# Patient Record
Sex: FEMALE | Race: WHITE | URBAN - METROPOLITAN AREA
[De-identification: names, ages, dates, MRNs, and addresses within clinical notes are randomized per-mention and may not be internally consistent; named-entity substitution may affect disease eponyms.]

---

## 2023-09-13 ENCOUNTER — OFFICE VISIT (OUTPATIENT)
Dept: FAMILY MEDICINE CLINIC | Facility: CLINIC | Age: 23
End: 2023-09-13
Payer: COMMERCIAL

## 2023-09-13 VITALS
OXYGEN SATURATION: 99 % | TEMPERATURE: 98 F | DIASTOLIC BLOOD PRESSURE: 68 MMHG | SYSTOLIC BLOOD PRESSURE: 118 MMHG | RESPIRATION RATE: 18 BRPM | HEART RATE: 97 BPM | WEIGHT: 242.19 LBS

## 2023-09-13 DIAGNOSIS — G43.909 MIGRAINE WITHOUT STATUS MIGRAINOSUS, NOT INTRACTABLE, UNSPECIFIED MIGRAINE TYPE: ICD-10-CM

## 2023-09-13 DIAGNOSIS — R53.83 OTHER FATIGUE: Primary | ICD-10-CM

## 2023-09-13 DIAGNOSIS — Z30.46 NEXPLANON REMOVAL: ICD-10-CM

## 2023-09-13 DIAGNOSIS — Z13.6 SCREENING FOR CARDIOVASCULAR CONDITION: ICD-10-CM

## 2023-09-13 DIAGNOSIS — Z87.820 HISTORY OF CONCUSSION: ICD-10-CM

## 2023-09-13 DIAGNOSIS — M54.12 CERVICAL RADICULOPATHY: ICD-10-CM

## 2023-09-13 DIAGNOSIS — Z23 NEED FOR VACCINATION: ICD-10-CM

## 2023-09-13 PROCEDURE — 90686 IIV4 VACC NO PRSV 0.5 ML IM: CPT | Performed by: FAMILY MEDICINE

## 2023-09-13 PROCEDURE — 90471 IMMUNIZATION ADMIN: CPT | Performed by: FAMILY MEDICINE

## 2023-09-13 PROCEDURE — 99203 OFFICE O/P NEW LOW 30 MIN: CPT | Performed by: FAMILY MEDICINE

## 2023-09-13 PROCEDURE — 3078F DIAST BP <80 MM HG: CPT | Performed by: FAMILY MEDICINE

## 2023-09-13 PROCEDURE — 3074F SYST BP LT 130 MM HG: CPT | Performed by: FAMILY MEDICINE

## 2023-09-13 RX ORDER — METHYLPREDNISOLONE 4 MG/1
TABLET ORAL
Qty: 1 EACH | Refills: 0 | Status: SHIPPED | OUTPATIENT
Start: 2023-09-13

## 2023-09-13 RX ORDER — DOXYCYCLINE HYCLATE 100 MG/1
100 CAPSULE ORAL 2 TIMES DAILY
COMMUNITY
Start: 2023-08-20 | End: 2023-09-13 | Stop reason: ALTCHOICE

## 2023-09-13 RX ORDER — CYCLOBENZAPRINE HCL 10 MG
10 TABLET ORAL NIGHTLY PRN
Qty: 30 TABLET | Refills: 0 | Status: SHIPPED | OUTPATIENT
Start: 2023-09-13

## 2023-09-13 RX ORDER — BENZONATATE 100 MG/1
100 CAPSULE ORAL 3 TIMES DAILY PRN
COMMUNITY
Start: 2023-08-20 | End: 2023-09-13 | Stop reason: ALTCHOICE

## 2023-09-13 RX ORDER — OMEPRAZOLE 40 MG/1
40 CAPSULE, DELAYED RELEASE ORAL DAILY
COMMUNITY
Start: 2023-07-13 | End: 2023-09-13 | Stop reason: ALTCHOICE

## 2023-09-13 RX ORDER — FLUTICASONE PROPIONATE 50 MCG
1 SPRAY, SUSPENSION (ML) NASAL 2 TIMES DAILY
COMMUNITY
Start: 2023-08-20

## 2023-11-06 NOTE — PROGRESS NOTES
Virtual/Telephone Check-In    Lucian Cespedes verbally consents to a Virtual/Telephone Check-In service on 11/06/23. Patient has been referred to the [facility] website at [email or web address] to review the yearly Consent to Treat document. Patient understands and accepts financial responsibility for any deductible, co-insurance and/or co-pays associated with this service. Telehealth Verbal Consent   I conducted a telehealth visit with Lucian Cespedes today, 11/06/23, which was completed using two-way, real-time interactive audio and video communication. This has been done in good navin to provide continuity of care in the best interest of the provider-patient relationship, due to the COVID -19 public health crisis/national emergency where restrictions of face-to-face office visits are ongoing. Every conscious effort was taken to allow for sufficient and adequate time to complete the visit. The patient was made aware of the limitations of the telehealth visit, including treatment limitations as no physical exam could be performed. The patient was advised to call 911 or to go to the ER in case there was an emergency. The patient was also advised of the potential privacy & security concerns related to the telehealth platform. The patient was made aware of where to find [facility] notice of privacy practices, telehealth consent form and other related consent forms and documents. which are located on the [facility] website. The patient verbally agreed to telehealth consent form, related consents and the risks discussed. Lastly, the patient confirmed that they were in [place]. Included in this visit, time may have been spent reviewing labs, medications, radiology tests and decision making. Appropriate medical decision-making and tests are ordered as detailed in the plan of care above.   Coding/billing information is submitted for this visit based on complexity of care and/or time spent for the visit. CHIEF COMPLAINT:  Patient presents with:  Sinus Problem      HPI:  Naveed Gallegos is a 21year old female who presents for a video visit. Pt presents for sinus congestion for 2 weeks. Symptoms have been worsening since onset. Sinus congestion/pain is described as a pressure and is located mainly at cheeks. Patient reports thick purulent nasal discharge, +sinus headache, +intermittent productive cough. Denies fever, sore throat, chills, dental pain, tinnitus. Has treated symptoms with Sudafed and Tylenol. Current Outpatient Medications   Medication Sig Dispense Refill    amoxicillin clavulanate 875-125 MG Oral Tab Take 1 tablet by mouth 2 (two) times daily for 7 days. 14 tablet 0    Acetaminophen 500 MG Oral Cap Take 2 capsules (1,000 mg total) by mouth. ibuprofen 600 MG Oral Tab Take 1 tablet (600 mg total) by mouth. fluticasone propionate 50 MCG/ACT Nasal Suspension 1 spray by Nasal route 2 (two) times daily. (Patient not taking: Reported on 10/18/2023)      cyclobenzaprine 10 MG Oral Tab Take 1 tablet (10 mg total) by mouth nightly as needed for Muscle spasms.  30 tablet 0     Past Medical History:   Diagnosis Date    Allergic rhinitis     Anxiety     Depression     Esophageal reflux     Obesity      Past Surgical History:   Procedure Laterality Date    CHOLECYSTECTOMY      FOREARM/WRIST SURGERY UNLISTED Right     ulnar nerve, 2019             Social History     Socioeconomic History    Marital status:    Tobacco Use    Smoking status: Never    Smokeless tobacco: Never   Vaping Use    Vaping Use: Some days    Substances: Nicotine   Substance and Sexual Activity    Alcohol use: Yes     Comment: socially    Drug use: Never    Sexual activity: Yes     Partners: Male     Birth control/protection: Implant   Other Topics Concern    Caffeine Concern No    Exercise No    Seat Belt No    Stress Concern Yes    Weight Concern Yes        REVIEW OF SYSTEMS:  GENERAL: ok appetite  SKIN: no rashes or abnormal skin lesions  HEENT: See HPI  LUNGS: denies shortness of breath or wheezing, See HPI  CARDIOVASCULAR: denies chest pain or palpitations   GI: denies N/V/C or abdominal pain  NEURO: + headaches    EXAM:  General: Alert, Well-appearing, and In no acute distress  Respiratory:   Speaking in full sentences comfortably  Normal work of breathing  No cough during visit  Head: Normocephalic, pressure and discomfort to cheeks per pt  Nose: No obvious nasal discharge. Skin: No obvious rashes or lesions from what observed. No results found for this or any previous visit (from the past 24 hour(s)). ASSESSMENT AND PLAN:  Mariela Mckinney is a 21year old female who presents with symptoms that are consistent with    ASSESSMENT:   Acute non-recurrent maxillary sinusitis  (primary encounter diagnosis)    PLAN: Meds as below. See patient Instructions    Meds & Refills for this Visit:  Requested Prescriptions     Signed Prescriptions Disp Refills    amoxicillin clavulanate 875-125 MG Oral Tab 14 tablet 0     Sig: Take 1 tablet by mouth 2 (two) times daily for 7 days. Risks, benefits, and side effects of medication explained and discussed. There are no Patient Instructions on file for this visit. The patient indicates understanding of these issues and agrees to the plan. The patient is asked to f/u with PCP if sx's persist or worsen. Mariela Mckinney understands video visit evaluation is not a substitute for face-to-face examination or emergency care. Patient advised to go to ER or call 911 for worsening symptoms or acute distress.

## 2023-11-08 NOTE — PROGRESS NOTES
Subjective:  21year old    Chief Complaint   Patient presents with    Procedure     Nexp. Removal.     Pt requesting removal of nexplanon 2/2 irregular menstrual bleeding  Pt desires other contraceptive option    Denies personal/family history of bleeding/blood clotting disorder  Denies migraine with aura  Denies smoking  Review of Systems:  Pertinent items are noted in the HPI. Objective:  /60   Ht 70\"   Wt 244 lb (110.7 kg)     Physical Examination:  General appearance: Well dressed, well nourished in no apparent distress  Neurologic/Psychiatric: Alert and oriented to person, place and time, mood normal, affect appropriate  Extremities: + Implant palpated LUE    Assessment/Plan:      Diagnoses and all orders for this visit:    Encounter for initial prescription of vaginal ring hormonal contraceptive  -     Etonogestrel-Ethinyl Estradiol (NUVARING) 0.12-0.015 MG/24HR Vaginal Ring; Place 1 Ring vaginally every 30 (thirty) days. 3 weeks in, one week out    Encounter for removal of subdermal contraceptive implant  -     Nexplanon Removal Only [68580]        Return for annual well woman exam or sooner if needed.

## 2023-11-08 NOTE — PROCEDURES
Nexplanon Removal Procedure Note    Post-Operative Diagnosis and Indication: Desires removal    Procedure Details: The risks including infection, scarring, bleeding and difficulty with removal were explained to the patient and verbal informed consent obtained. Procedure performed under sterile conditions with betadine prep. Incision site marked at distal tip of implant on left arm and area infiltrated with 1% lidocaine solution. Small stab incision made and nexplanon device removed intact without difficulty using ekaterina clamp. Steri strips, adhesive bandage and wrap bandage applied. Condition:  Stable    Complications:  None    Plan:  The patient was advised to call for any fever, redness, bruising, discharge or worsening pain. Follow up as needed or for routine gynecologic examination.

## 2024-02-15 NOTE — ED PROVIDER NOTES
Patient Seen in: [facility]      History     Chief Complaint   Patient presents with    Cough/URI     Stated Complaint: sore throat    Subjective:   HPI    23-year-old female here with complaint of a wheezy dry cough for the past couple days with sore throat and bodyaches.  Patient denies chest pain, shortness of breath, abdominal pain, nausea, vomiting or diarrhea.  Patient is tolerating p.o. speaking full sentences.  Afebrile.    Objective:   Past Medical History:   Diagnosis Date    Allergic rhinitis     Anxiety     Depression     Esophageal reflux     Obesity               Past Surgical History:   Procedure Laterality Date    CHOLECYSTECTOMY      FOREARM/WRIST SURGERY UNLISTED Right     ulnar nerve, 2019                      The patient's medication list, past medical history and social history elements  as listed in today's nurse's notes are reviewed and agree.   The patient's family history is reviewed and is noncontributory to the presenting problem, except as indicated as above.   Social History     Socioeconomic History    Marital status:    Tobacco Use    Smoking status: Never    Smokeless tobacco: Never   Vaping Use    Vaping Use: Former    Substances: Nicotine   Substance and Sexual Activity    Alcohol use: Yes     Comment: socially    Drug use: Never    Sexual activity: Yes     Partners: Male     Birth control/protection: Implant   Other Topics Concern    Caffeine Concern No    Exercise No    Seat Belt No    Stress Concern Yes    Weight Concern Yes              Review of Systems    Positive for stated complaint: sore throat  Other systems are as noted in HPI.  Constitutional and vital signs reviewed.      All other systems reviewed and negative except as noted above.    Physical Exam     ED Triage Vitals [02/15/24 1008]   /78   Pulse 108   Resp 20   Temp 97.7 °F (36.5 °C)   Temp src Temporal   SpO2 98 %   O2 Device None (Room air)       Current:/78   Pulse 108    Temp 97.7 °F (36.5 °C) (Temporal)   Resp 20   Wt 99.8 kg   LMP 02/01/2024   SpO2 98%   BMI 31.57 kg/m²         Physical Exam  Vitals and nursing note reviewed.   Constitutional:       Appearance: Normal appearance. She is well-developed.   HENT:      Head: Normocephalic.      Jaw: There is normal jaw occlusion.      Right Ear: Tympanic membrane and external ear normal.      Left Ear: Tympanic membrane and external ear normal.      Nose: Rhinorrhea present. Rhinorrhea is clear.      Mouth/Throat:      Lips: Pink.      Mouth: Mucous membranes are moist.      Pharynx: Oropharynx is clear. Posterior oropharyngeal erythema and uvula swelling present.      Comments: Uvula midline but enlarged: No trismus or drooling, no peritonsillar abscess noted moderate cobblestoning the posterior pharynx.  Eyes:      Conjunctiva/sclera: Conjunctivae normal.      Pupils: Pupils are equal, round, and reactive to light.   Cardiovascular:      Rate and Rhythm: Normal rate and regular rhythm.      Heart sounds: Normal heart sounds.   Pulmonary:      Effort: Pulmonary effort is normal.      Breath sounds: Wheezing present.   Musculoskeletal:      Cervical back: Normal range of motion and neck supple.   Skin:     General: Skin is warm.      Capillary Refill: Capillary refill takes less than 2 seconds.   Neurological:      General: No focal deficit present.      Mental Status: She is alert and oriented to person, place, and time.   Psychiatric:         Mood and Affect: Mood normal.         Behavior: Behavior normal.         Thought Content: Thought content normal.         Judgment: Judgment normal.             ED Course     Labs Reviewed   RAPID STREP A - Abnormal; Notable for the following components:       Result Value    Strep A by PCR Positive (*)     All other components within normal limits   POCT FLU TEST - Normal    Narrative:     This assay is a rapid molecular in vitro test utilizing nucleic acid amplification of influenza A  and B viral RNA.   RAPID SARS-COV-2 BY PCR - Normal                      MDM       Clinical Impression: strep pharyngitis/PND/wheezing  Course of Treatment:   Take the full course of antibiotics as prescribed.  Recommend a probiotic daily.  While the antibiotics kill the bad bacteria they also kill the good gut chemo.  GERD toothbrush.  Some good over-the-counter brands are Culturelle, Florastor and Align.  Recommend taking an over the counter antihistamine daily: IE zyrtec/claritin.  Sleep more upright. Use chloraseptic spray to help stop the cough trigger reflex.  Push fluids and gargle with warm saline rinses.   Use your inhaler every 4-6 hours as needed.  If symptoms persist or worsen i.e. increasing fevers or shortness of breath go directly to the emergency room.  Otherwise follow-up with your primary care physician for further evaluation and treatment.        The patient is encouraged to return if any concerning symptoms arise. Additional verbal discharge instructions are given and discussed. Discharge medications are discussed. The patient is in good condition throughout the visit today and remains so upon discharge. I discuss the plan of care with the patient, who expresses understanding. All questions and concerns are addressed to the patient's satisfaction prior to discharge today.  Previous conversations with PCP and charts were reviewed.                                       Disposition and Plan     Clinical Impression:  1. Strep pharyngitis    2. PND (post-nasal drip)    3. Wheezing         Disposition:  Discharge  2/15/2024 11:06 am    Follow-up:  Dorothy Lovell MD  [address]  [facility] [address]  [phone]          Dorothy Lovell MD  [address]  [facility] [address]  [phone]                Medications Prescribed:  Current Discharge Medication List        START taking these medications    Details   amoxicillin 500 MG Oral Tab Take 1 tablet (500 mg total) by  mouth 2 (two) times daily for 10 days.  Qty: 20 tablet, Refills: 0

## 2024-02-15 NOTE — DISCHARGE INSTRUCTIONS
Please return to the ER/clinic if symptoms worsen. Follow-up with your PCP in 24-48 hours as needed.    Take the full course of antibiotics as prescribed.  Recommend a probiotic daily.  While the antibiotics kill the bad bacteria they also kill the good gut chemo.  GERD toothbrush.  Some good over-the-counter brands are Culturelle, Florastor and Align.  Recommend taking an over the counter antihistamine daily: IE zyrtec/claritin.  Sleep more upright. Use chloraseptic spray to help stop the cough trigger reflex.  Push fluids and gargle with warm saline rinses.   Use your inhaler every 4-6 hours as needed.  If symptoms persist or worsen i.e. increasing fevers or shortness of breath go directly to the emergency room.  Otherwise follow-up with your primary care physician for further evaluation and treatment.

## 2024-02-15 NOTE — ED INITIAL ASSESSMENT (HPI)
Recently attended a wedding. Sunday, c/o headache and tiredness. Yesterday c/o body aches, sore throat, head ache, nausea, nasal congestion, cough and stuffy ears. Has pressure in rib cages.

## 2024-04-03 NOTE — TELEPHONE ENCOUNTER
Pt called, thinks she is having an allergic reaction. Reports she has been experiencing burning all over her body that leaves her light headed and feeling sick. States it started around Dec/Higinio and would happen once a month, but recently has been happening once a week.     Pt reports she experiencing the burning this morning, only her hands got bright red, which spread all over her body. She noticed red bumps on her arms, which went away after the burning stopped.     Pt reports she has not noticed a pattern to when it happens. Denies any new lotions, body washes, perfumes, detergent, fabric softeners, pets, medications, or exposure to new chemicals. Pt denies SOB, face/lips/throat swelling, or fever.    Informed pt that next available appt with PCP was on 4/5/2024. Offered appt with Dr. Alvarez today. Pt declined. States she works 8 am to 5 pm Monday through Friday.    Pt asked for appt with PCP. Appt set for 4/5/2024 at 1:20 pm.     Instructed pt to go to ED of she develops swelling of face/lips/throat, SOB, chest pain, or severe headache. Pt v/u.      Reason for Disposition   Mild widespread rash    Answer Assessment - Initial Assessment Questions  1. APPEARANCE of RASH: \"Describe the rash.\" (e.g., spots, blisters, raised areas, skin peeling, scaly)      Bumps  2. SIZE: \"How big are the spots?\" (e.g., tip of pen, eraser, coin; inches, centimeters)      Tip of pen  3. LOCATION: \"Where is the rash located?\"      Arms, maybe everywhere else   4. COLOR: \"What color is the rash?\" (Note: It is difficult to assess rash color in people with darker-colored skin. When this situation occurs, simply ask the caller to describe what they see.)      Red  5. ONSET: \"When did the rash begin?\"      A couple months ago, maybe Dec/Higinio  6. FEVER: \"Do you have a fever?\" If Yes, ask: \"What is your temperature, how was it measured, and when did it start?\"      No  7. ITCHING: \"Does the rash itch?\" If Yes, ask: \"How bad is the itch?\" (Scale  1-10; or mild, moderate, severe)      Yes, 10 out of 10  8. CAUSE: \"What do you think is causing the rash?\"      Possible allergic reaction  9. MEDICINE FACTORS: \"Have you started any new medicines within the last 2 weeks?\" (e.g., antibiotics)       No  10. OTHER SYMPTOMS: \"Do you have any other symptoms?\" (e.g., dizziness, headache, sore throat, joint pain)        Burning all over, arms hurt, light headed  11. PREGNANCY: \"Is there any chance you are pregnant?\" \"When was your last menstrual period?\"        No    Protocols used: Rash or Redness - Widespread-A-[place]

## 2024-04-08 NOTE — PROGRESS NOTES
CHIEF COMPLAINT:   Chief Complaint   Patient presents with    Itching     Patient is C/O a skin irritation. Itching and burning all over. She has had this for a couple of months         HPI:     Darling Metzger is a 23 year old female presents for Derm problem.    Derm problem:  Pt has noticed splotchy spots over her entire body.  Has been ongoing for a few months.  She feels a burning and itchy all over.  She denies any tongue or lip swelling, no difficulty breathing.  She took Benadryl the other day, but it made her so sleepy.  She has no hives. One time it showed up at white bumps , like goosebumps on her skin.  When the rash happens she turns bright red. She denies any new lotions/soaps/perfumes.               HISTORY:  Past Medical History:   Diagnosis Date    Allergic rhinitis     Anxiety     Depression     Esophageal reflux     Obesity       Past Surgical History:   Procedure Laterality Date    CHOLECYSTECTOMY      FOREARM/WRIST SURGERY UNLISTED Right     ulnar nerve, 2019            Family History   Problem Relation Age of Onset    Depression Mother     Obesity Mother     Psychiatric Mother     Depression Father     Psychiatric Father     Anemia Brother     Asthma Maternal Grandmother     Dementia Maternal Grandmother     Diabetes Maternal Grandmother     Heart Disorder Maternal Grandmother     Stroke Maternal Grandmother     Breast Cancer Neg     Colon Cancer Neg       Social History:   Social History     Socioeconomic History    Marital status:    Tobacco Use    Smoking status: Never    Smokeless tobacco: Never   Vaping Use    Vaping Use: Former    Substances: Nicotine   Substance and Sexual Activity    Alcohol use: Yes     Comment: socially    Drug use: Never    Sexual activity: Yes     Partners: Male     Birth control/protection: Implant   Other Topics Concern    Caffeine Concern No    Exercise No    Seat Belt No    Stress Concern Yes    Weight Concern Yes        Medications (Active  prior to today's visit):  Current Outpatient Medications   Medication Sig Dispense Refill    predniSONE 20 MG Oral Tab Take 2 tab (40 MG) PO QD x 5 days 10 tablet 0    Etonogestrel-Ethinyl Estradiol (NUVARING) 0.12-0.015 MG/24HR Vaginal Ring Place 1 Ring vaginally every 30 (thirty) days. 3 weeks in, one week out 3 each 3    Acetaminophen 500 MG Oral Cap Take 2 capsules (1,000 mg total) by mouth.      ibuprofen 600 MG Oral Tab Take 1 tablet (600 mg total) by mouth.      cyclobenzaprine 10 MG Oral Tab Take 1 tablet (10 mg total) by mouth nightly as needed for Muscle spasms. 30 tablet 0    fluticasone propionate 50 MCG/ACT Nasal Suspension 1 spray by Nasal route 2 (two) times daily. (Patient not taking: Reported on 10/18/2023)         Allergies:  No Known Allergies    PSFH elements reviewed from today and agreed except as otherwise stated in HPI.  ROS:     Review of Systems   Constitutional:  Negative for appetite change and fatigue.   Respiratory:  Negative for shortness of breath and wheezing.    Gastrointestinal:  Negative for abdominal pain, constipation, diarrhea, nausea and vomiting.   Skin:  Positive for color change and rash.         Pertinent positives and negatives noted in the the HPI.    PHYSICAL EXAM:   /72 (BP Location: Right arm, Patient Position: Sitting, Cuff Size: adult)   Pulse 105   Temp 100 °F (37.8 °C) (Temporal)   Resp 21   Wt 238 lb 6.4 oz (108.1 kg)   LMP 03/28/2024   SpO2 98%   BMI 34.21 kg/m²   Vital signs reviewed.Appears stated age, well groomed.  Physical Exam     LABS     Admission on 02/15/2024, Discharged on 02/15/2024   Component Date Value    Strep A by PCR 02/15/2024 Positive (A)     POCT INFLUENZA A 02/15/2024 Negative     POCT INFLUENZA B 02/15/2024 Negative     Rapid SARS-CoV-2 by PCR 02/15/2024 Not Detected       REVIEWED THIS VISIT  ASSESSMENT/PLAN:   23 year old female with    1. Pruritus  - unclear etiology  - START Predinsone x 5 days, R/B/SE of new med d/ wpt  -  advised after completion of Prednisone, may take OTC PO antihistamine BID, R/B/SE of new med d/w pt  - referral to Allergist  - predniSONE 20 MG Oral Tab; Take 2 tab (40 MG) PO QD x 5 days  Dispense: 10 tablet; Refill: 0  - Allergy Referral - In Network    2. Urticaria  See above.    - predniSONE 20 MG Oral Tab; Take 2 tab (40 MG) PO QD x 5 days  Dispense: 10 tablet; Refill: 0  - Allergy Referral - In Network      Meds This Visit:  Requested Prescriptions     Signed Prescriptions Disp Refills    predniSONE 20 MG Oral Tab 10 tablet 0     Sig: Take 2 tab (40 MG) PO QD x 5 days       Health Maintenance:  Health Maintenance   Topic Date Due    Chlamydia Screening  Never done    COVID-19 Vaccine (4 - 2023-24 season) 05/05/2024 (Originally 9/1/2023)    Annual Physical  10/18/2024    Pap Smear  11/02/2024    DTaP,Tdap,and Td Vaccines (8 - Td or Tdap) 04/25/2032    Influenza Vaccine  Completed    Annual Depression Screening  Completed    HPV Vaccines  Completed    Pneumococcal Vaccine: Birth to 64yrs  Aged Out         Patient/Caregiver Education: There are no barriers to learning. Medical education done.   Outcome: Patient verbalizes understanding and agrees with plan. Advised to call or RTC if symptoms persist or worsen.    Problem List:     Patient Active Problem List   Diagnosis    Mixed hyperlipidemia    Snoring    Arthritis of left hip    Biliary colic    Bipolar disorder (HCC)    Cholelithiasis without obstruction    Elevated transaminase level    Epigastric pain    Episode of recurrent major depressive disorder (HCC)    Other specified anxiety disorders    Gastritis    Obesity affecting pregnancy, antepartum (HCC)    Ulnar nerve palsy       Imaging & Referrals:  ALLERGY - INTERNAL     4/8/2024  Dorothy Lovell MD      Patient understands plan and follow-up.  No follow-ups on file.

## 2024-04-22 NOTE — TELEPHONE ENCOUNTER
Called pt, asked her the specific reason the allergist wants her to see rheumatology.     Pt reports she tested positive for WILLIAN and allergist suspects an autoimmune disorder.    Pt wants MCM sent to her once referral is signed.    Referral pended for provider review and signature.

## 2024-04-22 NOTE — TELEPHONE ENCOUNTER
Patient saw the allergist and they recommend that she go see a rheumatologist so she needs a referral

## 2024-04-23 NOTE — TELEPHONE ENCOUNTER
Referred to Provider Information:  Provider Address Phone   Kristyn Shell, DO [address] [place], [place] [id number] [phone]     Dorothy Lovell MD      4/22/24 10:03 PM  Note  Referral to Rheum Dr. Simons, signed.     Please inform pt that wait time is commongly 2-3 months wait for appt.  She can take first available with Dr. Simons or any other doctor in the office.     thanks          MCM sent to pt.

## 2024-04-23 NOTE — TELEPHONE ENCOUNTER
From: Darling Metzger  To: Dorothy Lovell  Sent: 4/23/2024 12:15 PM CDT  Subject: Referral     Hey,   I called yesterday asking about a referral to go see a rheumatologist. The allergist recommended it and suggested that I go through my PCP to get it. I was wondering if I need to come in and be seen again or if you could just send it to me. Thank you!     Darling Metzger

## 2024-04-23 NOTE — TELEPHONE ENCOUNTER
Referral to Rheum Dr. Simons, signed.    Please inform pt that wait time is commongly 2-3 months wait for appt.  She can take first available with Dr. Simons or any other doctor in the office.    thanks

## 2024-05-04 NOTE — ED PROVIDER NOTES
Patient Seen in: [facility]      History     Chief Complaint   Patient presents with    Cough/URI    Sore Throat     Stated Complaint: congestion, sore throat, ringing in ears, lethargic, loss of appetite    Subjective:   HPI  23-year-old who works at a  who his parents recently had COVID and strep is going around the  presents with multiple complaints.  Patient is complaining of congestion, ear congestion with ear ringing, fatigue, sore throat, decreased appetite, and a cough.  She denies any fever.  Patient's daughter also recently was sick.    Objective:   Past Medical History:    Allergic rhinitis    Anxiety    Depression    Esophageal reflux    Obesity              Past Surgical History:   Procedure Laterality Date    Cholecystectomy      Forearm/wrist surgery unlisted Right     ulnar nerve, 2019                      Social History     Socioeconomic History    Marital status:    Tobacco Use    Smoking status: Never    Smokeless tobacco: Never   Vaping Use    Vaping status: Some Days    Substances: Nicotine   Substance and Sexual Activity    Alcohol use: Yes     Comment: socially    Drug use: Never    Sexual activity: Yes     Partners: Male     Birth control/protection: Implant   Other Topics Concern    Caffeine Concern No    Exercise No    Seat Belt No    Stress Concern Yes    Weight Concern Yes     Social Determinants of Health     Financial Resource Strain: Medium Risk (2022)    Received from [facility]    Overall Financial Resource Strain (CARDIA)     Difficulty of Paying Living Expenses: Somewhat hard   Food Insecurity: No Food Insecurity (2022)    Received from [facility]    Hunger Vital Sign     Worried About Running Out of Food in the Last Year: Never true     Ran Out of Food in the Last Year: Never true   Transportation Needs: No Transportation Needs (2022)    Received from [facility]    PRAPARE -  Transportation     Lack of Transportation (Medical): No     Lack of Transportation (Non-Medical): No   Physical Activity: Unknown (2/8/2022)    Received from [facility]    Exercise Vital Sign     Days of Exercise per Week: 2 days     Minutes of Exercise per Session: Patient declined   Stress: Stress Concern Present (2/8/2022)    Received from [facility]    [employer] [place] [employer] - Occupational Stress Questionnaire     Feeling of Stress : Rather much   Social Connections: Unknown (2/8/2022)    Received from [facility]    Social Connection and Isolation Panel [NHANES]     Frequency of Communication with Friends and Family: Three times a week     Frequency of Social Gatherings with Friends and Family: Three times a week     Attends Episcopal Services: Never     Active Member of Clubs or Organizations: No     Attends Club or Organization Meetings: Never     Marital Status: Patient declined   Housing Stability: High Risk (2/8/2022)    Received from [facility]    Housing Stability Vital Sign     Unable to Pay for Housing in the Last Year: Yes     Number of Places Lived in the Last Year: 2     Unstable Housing in the Last Year: No              Review of Systems   All other systems reviewed and are negative.      Positive for stated complaint: congestion, sore throat, ringing in ears, lethargic, loss of appetite  Other systems are as noted in HPI.  Constitutional and vital signs reviewed.      All other systems reviewed and negative except as noted above.    Physical Exam     ED Triage Vitals [05/04/24 1308]   /74   Pulse 89   Resp 20   Temp 98.7 °F (37.1 °C)   Temp src Oral   SpO2 99 %   O2 Device None (Room air)       Current:/74   Pulse 89   Temp 98.7 °F (37.1 °C) (Oral)   Resp 20   Ht 177.8 cm (5' 10\")   Wt 104.3 kg   LMP 04/25/2024   SpO2 99%   BMI 33.00 kg/m²         Physical Exam  Vitals and nursing note reviewed.    Constitutional:       General: She is not in acute distress.     Appearance: She is well-developed. She is not ill-appearing or toxic-appearing.   HENT:      Right Ear: Tympanic membrane, ear canal and external ear normal.      Left Ear: Tympanic membrane, ear canal and external ear normal.      Nose: Congestion present. No rhinorrhea.      Mouth/Throat:      Pharynx: No oropharyngeal exudate or posterior oropharyngeal erythema.   Cardiovascular:      Rate and Rhythm: Normal rate and regular rhythm.      Heart sounds: Normal heart sounds.   Pulmonary:      Effort: Pulmonary effort is normal. No respiratory distress.      Breath sounds: Normal breath sounds. No wheezing.   Skin:     General: Skin is warm and dry.   Neurological:      Mental Status: She is alert and oriented to person, place, and time.               ED Course     Labs Reviewed   RAPID STREP A - Abnormal; Notable for the following components:       Result Value    Strep A by PCR Positive (*)     All other components within normal limits   POCT FLU TEST - Normal    Narrative:     This assay is a rapid molecular in vitro test utilizing nucleic acid amplification of influenza A and B viral RNA.   RAPID SARS-COV-2 BY PCR - Normal                      [facility]     Medical Decision Making  23-year-old who works at a  who his parents recently had COVID and strep is going around the  presents with multiple complaints.  Patient is complaining of congestion, ear congestion with ear ringing, fatigue, sore throat, decreased appetite, and a cough.  She denies any fever.  Patient's daughter also recently was sick.    Clinical impression: Strep    Differential diagnosis: COVID, flu, strep, pneumonia    Strep positive.  COVID and flu are negative.  Patient with clear lung sounds.  Patient is afebrile nontoxic with normal oxygen on room air.  Due to the significant congestion and sinus pressure as well as the length of her symptoms with the positive strep I  feel like she has 2 illnesses.  She will be given Augmentin with symptomatic treatment with Mucinex.    Problems Addressed:  Streptococcal sore throat: acute illness or injury        Disposition and Plan     Clinical Impression:  1. Streptococcal sore throat         Disposition:  Discharge  5/4/2024  1:38 pm    Follow-up:  No follow-up provider specified.        Medications Prescribed:  Discharge Medication List as of 5/4/2024  1:44 PM        START taking these medications    Details   amoxicillin clavulanate 875-125 MG Oral Tab Take 1 tablet by mouth 2 (two) times daily for 10 days., Normal, Disp-20 tablet, R-0

## 2024-05-04 NOTE — ED INITIAL ASSESSMENT (HPI)
Pt. States her throat hurts and her tonsils hurt. She also has congestion, cough, fatigue, loss of appetite, ear pain. Symptoms started about a week ago.

## 2024-05-04 NOTE — DISCHARGE INSTRUCTIONS
Take antibiotic as directed.  Tylenol and Motrin as needed for pain.  Mucinex for congestion or cough.

## 2024-05-29 NOTE — PROGRESS NOTES
RHEUMATOLOGY CLINIC- NEW PATIENT    Darling Mtezger is a 23 year old female.    ASSESSMENT/PLAN:       ICD-10-CM    1. Positive DARLING (antinuclear antibody)  R76.8 Sjogren's AB, Anti-SS-A/-SS-B     Smith Antibodies     Anti-RNP Antibodies, IgG     Centromere Antibody, IgG     Anti-SCL70 (Scleroderma) Antibody, IgG     Anti-Jo1 Antibody, IgG     dsDNA Antibody by IFA     Complement C3, Serum     Complement C4, Serum     RNA Polymerase III Antibody, IgG     Immune Complexes, C1Q Binding      2. Pruritus  L29.9 Immune Complexes, C1Q Binding        DISCUSSION:  Patient presents as a new outpatient referral from her PCP and allergy/immunology for recurrent pruritus affecting her whole body.  Symptoms not necessarily triggered by sun exposure nor cold-induced and affects her whole body.  Subsequent autoimmune workup notable for positive DARLING by [facility] with dsDNA as well as abnormal TSH.  Therefore, will order specific autoimmune serologies by IFA given better accuracy of results.    PLAN:  -Prior lab work reviewed with patient and  at the bedside  - Will check additional nonfasting labs, as above  - Consult/evaluation communicated with referring physician/provider.    I will MyChart patient on receipt of above results.  Follow-up pending results.    Carlton Cruz DO  5/29/2024   1:22 PM      Spent 60 minutes including about 40 minutes of face to face time obtaining history, evaluating patient, and discussing treatment options as well as reviewing prior notes for other providers, reviewing labs, and completing documentation      HPI:   I had the pleasure of seeing Darling Metzger on 5/29/2024 as a new outpatient consultation for +DARLING. The patient was originally referred by her PCP, Dr. Dorothy Lovell.     23 year old female w/ unremarkable PMH who presents to clinic today.Patient originally evaluated by allergy/immunology, Dr. Marcia Diego, for several episodes of  pruritic rash since  December 2023.  Was recommended environmental control strategies given allergens found on skin testing.  However, noted that the patient's mild seasonal pollen allergies would be unlikely to be the sole cause of her episodes especially given episodes in the wintertime.  Further lab work ordered notable for positive WILLIAN and elevated TSH.    Patient states symptoms originally started as hand discoloration/erythema with associated itchiness.  Then, symptoms progressed to involve her whole body including on sun exposed areas.  Symptoms do not seem to be triggered by sun exposure nor cold-induced.  These episodes will last for about an hour then will fade on their own.  Last episode about a week ago.  Has a history of mechanical injuries including a sliding injury with subsequent neck neck pain.  Notes that she constantly feels cold, has fluctuating constipation/diarrhea.  ROS otherwise negative for measured fevers, chills, Raynaud's phenomenon, sicca symptoms, oral/nasal ulcers, hematuria, history of pleurisy, history uveitis/iritis.  No known family history of autoimmune diseases such as gout, lupus, rheumatoid arthritis, psoriasis.    Current Medications:  N/A    Medication History:  N/A    Interval History:   See Above    HISTORY:  Past Medical History:    Allergic rhinitis    Anxiety    Depression    Esophageal reflux    Obesity      Social Hx Reviewed   Family Hx Reviewed     Medications (Active prior to today's visit):  Current Outpatient Medications   Medication Sig Dispense Refill    predniSONE 20 MG Oral Tab Take 2 tab (40 MG) PO QD x 5 days (Patient not taking: Reported on 5/4/2024) 10 tablet 0    Etonogestrel-Ethinyl Estradiol (NUVARING) 0.12-0.015 MG/24HR Vaginal Ring Place 1 Ring vaginally every 30 (thirty) days. 3 weeks in, one week out 3 each 3    Acetaminophen 500 MG Oral Cap Take 2 capsules (1,000 mg total) by mouth.      ibuprofen 600 MG Oral Tab Take 1 tablet (600 mg total) by mouth.      fluticasone  propionate 50 MCG/ACT Nasal Suspension 1 spray by Nasal route 2 (two) times daily. (Patient not taking: Reported on 10/18/2023)      cyclobenzaprine 10 MG Oral Tab Take 1 tablet (10 mg total) by mouth nightly as needed for Muscle spasms. 30 tablet 0       Allergies:  No Known Allergies      ROS:   Review of Systems   Constitutional:  Negative for chills and fever.   HENT:  Negative for congestion, hearing loss, mouth sores, nosebleeds and trouble swallowing.    Eyes:  Negative for photophobia, pain, redness and visual disturbance.   Respiratory:  Negative for cough and shortness of breath.    Cardiovascular:  Negative for chest pain, palpitations and leg swelling.   Gastrointestinal:  Negative for abdominal pain, blood in stool, diarrhea and nausea.   Endocrine: Negative for cold intolerance and heat intolerance.   Genitourinary:  Negative for dysuria, frequency and hematuria.   Musculoskeletal:  Positive for arthralgias. Negative for back pain, gait problem, joint swelling, myalgias, neck pain and neck stiffness.   Skin:  Positive for rash. Negative for color change.   Neurological:  Negative for dizziness, weakness, numbness and headaches.   Psychiatric/Behavioral:  Negative for confusion and dysphoric mood.         PHYSICAL EXAM:     Constitutional:  Well developed, Well nourished, No acute distress  HENT:  Normocephalic, Atraumatic, Bilateral external ears normal, Oropharynx moist, No oral exudates.  Neck: Normal range of motion, No tenderness, Supple, No stridor.  Eyes:  PERRL, EOMI, Conjunctiva normal, No discharge.  Respiratory:  Normal breath sounds, No respiratory distress, No wheezing.  Cardiovascular:  Normal heart rate, Normal rhythm, No murmurs, No rubs, No gallops.  GI:  Bowel sounds normal, Soft, No tenderness, No masses, No pulsatile masses.  : No CVA tenderness.  Musculoskeletal:  A comprehensive 28 count joint exam was done and was negative for swelling or tenderness except as noted. Inspections  for misalignment, asymmetry, crepitation, defects, tenderness, masses, nodules, effusions, range of motion, and stability in the upper and lower extremities bilaterally are all normal unless noted.           Integument:  Warm, Dry, No erythema, No rash.  Lymphatic:  No lymphadenopathy noted.  Neurologic:  Alert & oriented x 3, Normal motor function, Normal sensory function, No focal deficits noted.  Psychiatric:  Affect normal, Judgment normal, Mood normal.    LABS:     Prior lab work reviewed and notable for:    4/15/2024:  WILLIAN screen by [facility] positive with dsDNA 11  TSH 5.630 (high), thyroid antiglobulin and TPO negative, free T41.26 WNL    2024:  CBC with WBC 18.8, Hgb 17,  WNL  CMP with normal renal function, borderline ALT 48, normal AST/alk phos,    2022:  CK 35 WNL  Imagin/14/23 Cervical Spine MRI:     Impression   CONCLUSION:       Normal cervical spine MRI

## 2024-06-14 NOTE — ED INITIAL ASSESSMENT (HPI)
Bilat ear pain, swollen lymph nodes & scratchy throat x 1 week.  Pain to back of Neck.  No relief w tylenol.

## 2024-06-14 NOTE — ED PROVIDER NOTES
Patient Seen in: [facility]      History     Chief Complaint   Patient presents with    Ear Problem Pain     Stated Complaint: Lymph nodes swollen ha neck stiffness ear pain    Subjective:   HPI  Darling Metzger is a 23 year old female  presents with throat pain x 5 days. Patient reports dysphagia to both solids and liquids, ear pain, rhinorrhea, fevers, chills. Patient denies shortness of breath, respiratory distress, stridor, neck pain/ stiffness, headache, eye pain/ redness, facial/ lip/ eyelid swelling. No medications taken prior to arrival. No alleviating/ aggravating factors. Pain 3/10          Objective:   Past Medical History:    Allergic rhinitis    Anxiety    Depression    Esophageal reflux    Obesity              Past Surgical History:   Procedure Laterality Date    Cholecystectomy      Forearm/wrist surgery unlisted Right     ulnar nerve, 2019                      Social History     Socioeconomic History    Marital status:    Tobacco Use    Smoking status: Never    Smokeless tobacco: Never   Vaping Use    Vaping status: Some Days    Substances: Nicotine   Substance and Sexual Activity    Alcohol use: Yes     Comment: socially    Drug use: Never    Sexual activity: Yes     Partners: Male     Birth control/protection: Implant   Other Topics Concern    Caffeine Concern No    Exercise No    Seat Belt No    Stress Concern Yes    Weight Concern Yes     Social Determinants of Health     Financial Resource Strain: Medium Risk (2022)    Received from [facility]    Overall Financial Resource Strain (CARDIA)     Difficulty of Paying Living Expenses: Somewhat hard   Food Insecurity: No Food Insecurity (2022)    Received from [facility]    Hunger Vital Sign     Worried About Running Out of Food in the Last Year: Never true     Ran Out of Food in the Last Year: Never true   Transportation Needs: No Transportation Needs (2022)     Received from [facility]    PRAPARE - Transportation     Lack of Transportation (Medical): No     Lack of Transportation (Non-Medical): No   Physical Activity: Unknown (2/8/2022)    Received from [facility]    Exercise Vital Sign     Days of Exercise per Week: 2 days     Minutes of Exercise per Session: Patient declined   Stress: Stress Concern Present (2/8/2022)    Received from [facility]    [employer] - Occupational Stress Questionnaire     Feeling of Stress : Rather much   Social Connections: Unknown (2/8/2022)    Received from [facility]    Social Connection and Isolation Panel [NHANES]     Frequency of Communication with Friends and Family: Three times a week     Frequency of Social Gatherings with Friends and Family: Three times a week     Attends Mandaen Services: Never     Active Member of Clubs or Organizations: No     Attends Club or Organization Meetings: Never     Marital Status: Patient declined   Housing Stability: High Risk (2/8/2022)    Received from [facility]    Housing Stability Vital Sign     Unable to Pay for Housing in the Last Year: Yes     Number of Places Lived in the Last Year: 2     Unstable Housing in the Last Year: No              Review of Systems   All other systems reviewed and are negative.      Positive for stated complaint: Lymph nodes swollen ha neck stiffness ear pain  Other systems are as noted in HPI.  Constitutional and vital signs reviewed.      All other systems reviewed and negative except as noted above.    Physical Exam     ED Triage Vitals [06/14/24 1051]   /66   Pulse 55   Resp 18   Temp 97.7 °F (36.5 °C)   Temp src Temporal   SpO2 98 %   O2 Device None (Room air)       Current Vitals:   Vital Signs  BP: 123/66  Pulse: 55  Resp: 18  Temp: 97.7 °F (36.5 °C)  Temp src: Temporal    Oxygen Therapy  SpO2: 98 %  O2 Device: None (Room air)            Physical  Exam  Vitals and nursing note reviewed.   Constitutional:       General: She is not in acute distress.     Appearance: Normal appearance. She is normal weight. She is not ill-appearing, toxic-appearing or diaphoretic.   HENT:      Head: Normocephalic and atraumatic.      Right Ear: Tympanic membrane, ear canal and external ear normal.      Left Ear: Tympanic membrane, ear canal and external ear normal.      Nose: Congestion present. No rhinorrhea.      Mouth/Throat:      Mouth: Mucous membranes are moist.      Pharynx: Oropharynx is clear. No oropharyngeal exudate or posterior oropharyngeal erythema.   Eyes:      General: No scleral icterus.        Right eye: No discharge.         Left eye: No discharge.      Extraocular Movements: Extraocular movements intact.      Conjunctiva/sclera: Conjunctivae normal.      Pupils: Pupils are equal, round, and reactive to light.   Pulmonary:      Effort: Pulmonary effort is normal. No respiratory distress.      Breath sounds: No stridor. No wheezing, rhonchi or rales.   Chest:      Chest wall: No tenderness.   Musculoskeletal:         General: No swelling, tenderness, deformity or signs of injury. Normal range of motion.      Cervical back: Normal range of motion and neck supple.   Skin:     General: Skin is warm and dry.      Capillary Refill: Capillary refill takes less than 2 seconds.      Coloration: Skin is not jaundiced or pale.      Findings: No bruising, erythema, lesion or rash.   Neurological:      General: No focal deficit present.      Mental Status: She is alert and oriented to person, place, and time. Mental status is at baseline.   Psychiatric:         Mood and Affect: Mood normal.         Behavior: Behavior normal.               ED Course     Labs Reviewed   POCT RAPID STREP - Normal                      MDM                                        Medical Decision Making  23-year-old well-appearing female presents with acute nonspecific pharyngitis started over 5  days prior to arrival.  Considerations to include but not limited to peritonsillar abscess versus viral pharyngitis versus mononucleosis versus retropharyngeal abscess  Plan  - SpO2 98% on room air    - labs: strep swab  - reassess  - DC to home   - rx: zyrtec 10mg po daily.  Flonase 2 sprays to bilateral nostrils BID.  Afrin 2 sprays to bilateral nostrils BID for no more than 48 consecutive hours to avoid rebound congestion.  Sudafed 30mg po q 6 hours.   Tessalon 100mg PO TID.    - OTC: Tylenol 1 g po q 6 hours/ prn. ibuprofen 600mg po q 8 hours/ prn.  cepacol lozenges po every 4-6 hours/ prn.   - encourage oral fluid rehydration and warm salt water rinses for symptomatic relief.   - refer to primary care physician  - Return to ED if symptoms worsen    Amount and/or Complexity of Data Reviewed  Labs: ordered. Decision-making details documented in ED Course.     Details: Strep swab negative        Disposition and Plan     Clinical Impression:  1. Viral pharyngitis    2. Sinus headache         Disposition:  Discharge  6/14/2024 12:28 pm    Follow-up:  Dorothy Lovell MD  [address]  [place]  [facility] [id number] [phone]          [address] [id number] [phone]              Medications Prescribed:  Current Discharge Medication List        START taking these medications    Details   benzonatate 100 MG Oral Cap Take 1 capsule (100 mg total) by mouth 3 (three) times daily as needed for cough.  Qty: 30 capsule, Refills: 0      cetirizine 10 MG Oral Tab Take 1 tablet (10 mg total) by mouth daily.  Qty: 30 tablet, Refills: 0      !! fluticasone propionate 50 MCG/ACT Nasal Suspension 2 sprays by Nasal route daily.  Qty: 16 g, Refills: 0      oxymetazoline 0.05 % Nasal Solution 1 spray by Nasal route 2 (two) times daily.  Qty: 15 mL, Refills: 0      pseudoephedrine 30 MG Oral Tab Take 1 tablet (30 mg total) by mouth in the morning, at noon, and at bedtime  for 5 days.  Qty: 15 tablet, Refills: 0    Associated Diagnoses: Viral pharyngitis; Sinus headache       !! - Potential duplicate medications found. Please discuss with provider.

## 2024-08-19 NOTE — TELEPHONE ENCOUNTER
Last OV: 11/8/2023  Last refill date:   Medication Quantity Refills Start End   Etonogestrel-Ethinyl Estradiol (NUVARING) 0.12-0.015 MG/24HR Vaginal Ring 3 each 3 11/8/2023 --   Sig:   Place 1 Ring vaginally every 30 (thirty) days. 3 weeks in, one week out       Follow-up: RTC for annual  Next appt.: none scheduled  Refill denied per protocol.  Patient notified by MyChart that appointment is required.

## 2024-08-29 NOTE — DISCHARGE INSTRUCTIONS
Please take Augmentin as prescribed.  Tylenol and ibuprofen for pain or fever.  Infection precautions reviewed.  PCP/ENT follow-up

## 2024-08-29 NOTE — ED INITIAL ASSESSMENT (HPI)
1 week of nausea. Sunday developed a headache, bilateral ear pain, neck lymph nodes are swollen and tender. Cough started yesterday. Denies fevers. Tyl taken at 0700 today.

## 2024-08-29 NOTE — ED PROVIDER NOTES
Patient Seen in: [facility]      History     Chief Complaint   Patient presents with    Cough/URI     Stated Complaint: Cough/URI, Headache    Subjective:   This is a 23-year-old female with below stated medical history.  Presents to immediate care reporting 1 week of nausea, sore throat, swollen lymph nodes, and headache.  Patient reports a feeling of fullness in the ears.  Cough is developed over the last 2 to 3 days.  No difficulty breathing or wheezing.  No chest pain or shortness of breath.  No neck pain or neck stiffness.  No voice change or stridor.  No treatment attempted prior to arrival.    The history is provided by the patient.           Objective:   Past Medical History:    Allergic rhinitis    Anxiety    Depression    Esophageal reflux    Obesity              Past Surgical History:   Procedure Laterality Date    Cholecystectomy      Forearm/wrist surgery unlisted Right     ulnar nerve, 2019                      Social History     Socioeconomic History    Marital status:    Tobacco Use    Smoking status: Never    Smokeless tobacco: Never   Vaping Use    Vaping status: Former    Substances: Nicotine   Substance and Sexual Activity    Alcohol use: Yes     Comment: socially    Drug use: Never    Sexual activity: Yes     Partners: Male     Birth control/protection: Implant   Other Topics Concern    Caffeine Concern No    Exercise No    Seat Belt No    Stress Concern Yes    Weight Concern Yes     Social Determinants of Health     Financial Resource Strain: Medium Risk (2022)    Received from [facility]    Overall Financial Resource Strain (CARDIA)     Difficulty of Paying Living Expenses: Somewhat hard   Food Insecurity: No Food Insecurity (2022)    Received from [facility]    Hunger Vital Sign     Worried About Running Out of Food in the Last Year: Never true     Ran Out of Food in the Last Year: Never true   Transportation Needs: No  Patient was recently discharged from [place] [address] on March 3rd with type 1 MI  Patient had troponin greater than 40 during that admission  Patient underwent cardiac catheterization which showed severe three-vessel CAD, s/p PCI of LAD  Cardiomyopathy with severely reduced LV systolic dysfunction ejection fraction 24% with grade 3 diastolic dysfunction  Patient was not considered a good candidate for CABG because of his comorbidities    Continue on medical management  Patient had LifeVest placed  On aspirin, Brilinta, Lipitor, Toprol-XL  Denies any chest pain or shortness of breath Transportation Needs (2/8/2022)    Received from [facility]    PRAPARE - Transportation     Lack of Transportation (Medical): No     Lack of Transportation (Non-Medical): No   Physical Activity: Unknown (2/8/2022)    Received from [facility]    Exercise Vital Sign     Days of Exercise per Week: 2 days     Minutes of Exercise per Session: Patient declined   Stress: Stress Concern Present (2/8/2022)    Received from [facility]    [place] of Occupational Health - Occupational Stress Questionnaire     Feeling of Stress : Rather much   Social Connections: Unknown (2/8/2022)    Received from [facility]    Social Connection and Isolation Panel [NHANES]     Frequency of Communication with Friends and Family: Three times a week     Frequency of Social Gatherings with Friends and Family: Three times a week     Attends Faith Services: Never     Active Member of Clubs or Organizations: No     Attends Club or Organization Meetings: Never     Marital Status: Patient declined   Housing Stability: High Risk (2/8/2022)    Received from [facility]    Housing Stability Vital Sign     Unable to Pay for Housing in the Last Year: Yes     Number of Places Lived in the Last Year: 2     Unstable Housing in the Last Year: No              Review of Systems   Constitutional:  Negative for chills and fever.   HENT:  Positive for congestion, ear pain and sore throat. Negative for trouble swallowing and voice change.    Respiratory:  Negative for cough.    Cardiovascular:  Negative for chest pain.   Gastrointestinal:  Positive for nausea. Negative for abdominal pain, constipation, diarrhea and vomiting.   Genitourinary:  Negative for dysuria.   Musculoskeletal:  Negative for neck pain and neck stiffness.   Skin:  Negative for rash.   Neurological:  Positive for headaches.   Hematological:  Does not bruise/bleed easily.       Positive for stated Chief Complaint:  Cough/URI    Other systems are as noted in HPI.  Constitutional and vital signs reviewed.      All other systems reviewed and negative except as noted above.    Physical Exam     ED Triage Vitals [08/29/24 1753]   /88   Pulse 108   Resp 16   Temp 97.6 °F (36.4 °C)   Temp src Temporal   SpO2 98 %   O2 Device None (Room air)       Current Vitals:   Vital Signs  BP: 124/88  Pulse: 108  Resp: 16  Temp: 97.6 °F (36.4 °C)  Temp src: Temporal    Oxygen Therapy  SpO2: 98 %  O2 Device: None (Room air)            Physical Exam  Vitals and nursing note reviewed.   Constitutional:       General: She is not in acute distress.     Appearance: Normal appearance. She is obese. She is not ill-appearing, toxic-appearing or diaphoretic.   HENT:      Head: Normocephalic and atraumatic.      Right Ear: Tympanic membrane, ear canal and external ear normal. There is no impacted cerumen.      Left Ear: Tympanic membrane, ear canal and external ear normal. There is no impacted cerumen.      Nose: Congestion present. No rhinorrhea.      Mouth/Throat:      Mouth: Mucous membranes are moist. No angioedema.      Pharynx: Oropharynx is clear. Uvula midline. Posterior oropharyngeal erythema present. No pharyngeal swelling, oropharyngeal exudate or uvula swelling.      Tonsils: Tonsillar exudate present. No tonsillar abscesses. 1+ on the right. 1+ on the left.   Eyes:      General:         Right eye: No discharge.         Left eye: No discharge.      Extraocular Movements: Extraocular movements intact.      Conjunctiva/sclera: Conjunctivae normal.   Cardiovascular:      Rate and Rhythm: Normal rate and regular rhythm.      Heart sounds: Normal heart sounds. No murmur heard.  Pulmonary:      Effort: Pulmonary effort is normal. No respiratory distress.      Breath sounds: Normal breath sounds. No stridor. No wheezing, rhonchi or rales.   Musculoskeletal:      Cervical back: Neck supple.      Right lower leg: No edema.      Left lower leg: No  edema.   Skin:     General: Skin is warm and dry.      Capillary Refill: Capillary refill takes less than 2 seconds.      Findings: No rash.   Neurological:      Mental Status: She is alert and oriented to person, place, and time.   Psychiatric:         Mood and Affect: Mood normal.         Behavior: Behavior normal.               ED Course     Labs Reviewed   [facility] POCT URINALYSIS DIPSTICK - Abnormal; Notable for the following components:       Result Value    Urine Clarity Cloudy (*)     PH, Urine 8.5 (*)     Leukocyte esterase urine Trace (*)     All other components within normal limits   RAPID STREP A - Abnormal; Notable for the following components:    Strep A by PCR Positive (*)     All other components within normal limits   POCT PREGNANCY URINE - Normal   RAPID SARS-COV-2 BY PCR - Normal             UA, Urine preg, Strep, covid         MDM        Vital signs stable.  Patient is well-appearing and nontoxic looking.  Presents to immediate care for multiple complaints.    Differential diagnosis includes is not limited to strep pharyngitis, COVID, mono, other viral illness, PTA, pregnancy, pneumonia    Bilateral TMs are intact and clear.  Lungs are clear bilaterally.  No evidence of respiratory stress or hypoxia.  No suspicion for pneumonia.  Posterior pharynx is erythemic with bilateral tonsillar swelling.  Tonsils are covered in exudates.  No evidence of PTA.  Rapid COVID is negative.  Rapid strep is positive.    Patient states she has had multiple bouts of strep this year.    DC home.  Course of Augmentin prescribed.  Recommended over-the-counter antihistamines and Flonase.  Tylenol and ibuprofen for pain or fever. Symptomatic and supportive care discussed.  Recommended follow-up with ENT and PCP.  Patient verbalized understanding, negative plan of care.  All questions were answered.                           Medical Decision Making      Disposition and Plan     Clinical Impression:  1. Streptococcal sore  throat    2. Acute pansinusitis, recurrence not specified         Disposition:  Discharge  8/29/2024  6:21 pm    Follow-up:  Haroldo Edwards MD  [address]  [facility] [address] [phone]    In 2 weeks      Dorothy Lovell MD  [address] [facility] [id number] [phone]    In 1 week            Medications Prescribed:  Discharge Medication List as of 8/29/2024  6:28 PM        START taking these medications    Details   amoxicillin clavulanate 875-125 MG Oral Tab Take 1 tablet by mouth 2 (two) times daily for 10 days., Normal, Disp-20 tablet, R-0

## 2024-10-16 NOTE — ED PROVIDER NOTES
He    Patient Seen in: [facility]      History     Chief Complaint   Patient presents with    Sore Throat     Stated Complaint: Sore throat  Subjective:   24-year-old healthy female presents for URI symptoms, sore throat, and bilateral ear pain that started a couple days ago.  She was exposed to sickness at work.  She works with children.  No difficulty breathing.  No chest pain.  No fevers.  She does have nasal congestion and a dry cough.  No difficulty swallowing.  Speech is clear.  She is eating and drinking without vomiting or diarrhea.  No drainage from the ears.  No hearing loss.  No mastoid complaints.  She appears nontoxic.      Objective:   Past Medical History:    Allergic rhinitis    Anxiety    Depression    Esophageal reflux    Obesity            Past Surgical History:   Procedure Laterality Date    Cholecystectomy      Forearm/wrist surgery unlisted Right     ulnar nerve, 2019                    Social History     Socioeconomic History    Marital status:    Tobacco Use    Smoking status: Never     Passive exposure: Never    Smokeless tobacco: Never   Vaping Use    Vaping status: Former    Substances: Nicotine   Substance and Sexual Activity    Alcohol use: Yes     Comment: socially    Drug use: Never    Sexual activity: Yes     Partners: Male     Birth control/protection: Implant   Other Topics Concern    Caffeine Concern No    Exercise No    Seat Belt No    Stress Concern Yes    Weight Concern Yes     Social Drivers of Health     Financial Resource Strain: Medium Risk (2022)    Received from [facility]    Overall Financial Resource Strain (CARDIA)     Difficulty of Paying Living Expenses: Somewhat hard   Food Insecurity: No Food Insecurity (2022)    Received from [facility]    Hunger Vital Sign     Worried About Running Out of Food in the Last Year: Never true     Ran Out of Food in the Last Year: Never true   Transportation Needs: No  Transportation Needs (2/8/2022)    Received from [facility]    PRAPARE - Transportation     Lack of Transportation (Medical): No     Lack of Transportation (Non-Medical): No   Physical Activity: Unknown (2/8/2022)    Received from [facility]    Exercise Vital Sign     Days of Exercise per Week: 2 days     Minutes of Exercise per Session: Patient declined   Stress: Stress Concern Present (2/8/2022)    Received from [facility]    [place] of Occupational Health - Occupational Stress Questionnaire     Feeling of Stress : Rather much   Social Connections: Unknown (2/8/2022)    Received from [facility]    Social Connection and Isolation Panel [NHANES]     Frequency of Communication with Friends and Family: Three times a week     Frequency of Social Gatherings with Friends and Family: Three times a week     Attends Yazdanism Services: Never     Active Member of Clubs or Organizations: No     Attends Club or Organization Meetings: Never     Marital Status: Patient declined   Housing Stability: High Risk (2/8/2022)    Received from [facility]    Housing Stability Vital Sign     Unable to Pay for Housing in the Last Year: Yes     Number of Places Lived in the Last Year: 2     Unstable Housing in the Last Year: No            Review of Systems    Positive for stated complaint: Sore Throat     Other systems are as noted in HPI.  Constitutional and vital signs reviewed.      All other systems reviewed and negative except as noted above.    Physical Exam     ED Triage Vitals [10/16/24 1747]   /81   Pulse 105   Resp 21   Temp 98.3 °F (36.8 °C)   Temp src Temporal   SpO2 100 %   O2 Device None (Room air)     Current:/81   Pulse 105   Temp 98.3 °F (36.8 °C) (Temporal)   Resp 21   LMP 09/13/2024 (Approximate)   SpO2 100%     Physical Exam  Vitals and nursing note reviewed.   Constitutional:       General: She is not in acute distress.      Appearance: She is well-developed. She is not toxic-appearing.   HENT:      Head: Normocephalic.      Right Ear: A middle ear effusion is present. Tympanic membrane is erythematous.      Left Ear: A middle ear effusion is present. Tympanic membrane is not erythematous.      Nose: Congestion present. No rhinorrhea.      Mouth/Throat:      Mouth: Mucous membranes are moist. No oral lesions.      Pharynx: Oropharynx is clear. Uvula midline. Posterior oropharyngeal erythema present. No pharyngeal swelling, oropharyngeal exudate or uvula swelling.      Tonsils: No tonsillar exudate or tonsillar abscesses.   Eyes:      Conjunctiva/sclera: Conjunctivae normal.   Cardiovascular:      Rate and Rhythm: Normal rate and regular rhythm.   Pulmonary:      Effort: Pulmonary effort is normal.      Breath sounds: Normal breath sounds. No wheezing, rhonchi or rales.   Musculoskeletal:      Cervical back: Normal range of motion and neck supple.   Lymphadenopathy:      Cervical: Cervical adenopathy present.   Skin:     General: Skin is warm and dry.      Capillary Refill: Capillary refill takes less than 2 seconds.      Findings: No rash.   Neurological:      General: No focal deficit present.      Mental Status: She is alert and oriented to person, place, and time.   Psychiatric:         Mood and Affect: Mood normal.         Behavior: Behavior normal.         ED Course   No results found.  Labs Reviewed   POCT RAPID STREP - Normal   RAPID SARS-COV-2 BY PCR - Normal       MDM     Medical Decision Making  The strep and COVID are negative.  I will treat the right otitis media with Augmentin.  Her URI symptoms are most likely viral.  We discussed pushing fluids, rest, and Tylenol or Motrin as needed for pain or fever.  She will follow-up as needed with her primary care doctor.    Amount and/or Complexity of Data Reviewed  Labs: ordered.     Details: Strep and COVID are negative.    Risk  OTC drugs.  Prescription drug management.  Risk  Details: COVID versus strep throat versus URI        Disposition and Plan     Clinical Impression:  1. Upper respiratory virus    2. Right otitis media, unspecified otitis media type         Disposition:  Discharge  10/16/2024  6:32 pm    Follow-up:  Dorothy Lovell MD  [address]  [facility] [address]  [phone]    Schedule an appointment as soon as possible for a visit   As needed          Medications Prescribed:  Discharge Medication List as of 10/16/2024  6:35 PM

## 2024-10-29 NOTE — PROGRESS NOTES
Darling Metzger is a 24 year old female.   Chief Complaint   Patient presents with    Strep Throat     6 or 7 times since January- with ear infection- ear is still painful- throat feels okay today but lymph nodes feel swollen      HPI:   24 year old female presenting for evaluation of recurrent tonsillitis. She has had multiple strep throat tests performed this year, with 3 being positive in the [facility] system. She says she had 2-3 other positive tests at an outside facility. She reports being told she was a carrier as a kid and typically gets 1-2 episodes of strep per year. She had 6-7 episodes of tonsillitis this year which is more than normal for her. Was seen in urgent care on 10/16, diagnosed with URI and right otitis media and prescribed augmentin which she just completed. Uses nicotine vape.     Current Outpatient Medications   Medication Sig Dispense Refill    fluticasone propionate 50 MCG/ACT Nasal Suspension 2 sprays by Nasal route daily. 16 g 0    Omeprazole 20 MG Oral Tab EC Take 20 mg by mouth daily. (Patient not taking: Reported on 10/16/2024)      predniSONE 20 MG Oral Tab Take 2 tab (40 MG) PO QD x 5 days (Patient not taking: Reported on 10/16/2024) 10 tablet 0    Etonogestrel-Ethinyl Estradiol (NUVARING) 0.12-0.015 MG/24HR Vaginal Ring Place 1 Ring vaginally every 30 (thirty) days. 3 weeks in, one week out (Patient not taking: Reported on 10/16/2024) 3 each 3    Acetaminophen 500 MG Oral Cap Take 2 capsules (1,000 mg total) by mouth. (Patient not taking: Reported on 10/16/2024)      ibuprofen 600 MG Oral Tab Take 1 tablet (600 mg total) by mouth. (Patient not taking: Reported on 10/16/2024)      fluticasone propionate 50 MCG/ACT Nasal Suspension 1 spray by Nasal route 2 (two) times daily. (Patient not taking: Reported on 10/18/2023)      cyclobenzaprine 10 MG Oral Tab Take 1 tablet (10 mg total) by mouth nightly as needed for Muscle spasms. (Patient not taking: Reported on 10/16/2024) 30  tablet 0      Past Medical History:    Allergic rhinitis    Anxiety    Depression    Esophageal reflux    Obesity      Social History:  Social History     Socioeconomic History    Marital status:    Tobacco Use    Smoking status: Never     Passive exposure: Never    Smokeless tobacco: Never   Vaping Use    Vaping status: Former    Substances: Nicotine   Substance and Sexual Activity    Alcohol use: Yes     Comment: socially    Drug use: Never    Sexual activity: Yes     Partners: Male     Birth control/protection: Implant   Other Topics Concern    Caffeine Concern No    Exercise No    Seat Belt No    Stress Concern Yes    Weight Concern Yes     Social Drivers of Health     Financial Resource Strain: Medium Risk (2/8/2022)    Received from [facility]    Overall Financial Resource Strain (CARDIA)     Difficulty of Paying Living Expenses: Somewhat hard   Food Insecurity: No Food Insecurity (2/8/2022)    Received from [facility]    Hunger Vital Sign     Worried About Running Out of Food in the Last Year: Never true     Ran Out of Food in the Last Year: Never true   Transportation Needs: No Transportation Needs (2/8/2022)    Received from [facility]    PRAPARE - Transportation     Lack of Transportation (Medical): No     Lack of Transportation (Non-Medical): No   Physical Activity: Unknown (2/8/2022)    Received from [facility]    Exercise Vital Sign     Days of Exercise per Week: 2 days     Minutes of Exercise per Session: Patient declined   Stress: Stress Concern Present (2/8/2022)    Received from [facility]    [place] of Occupational Health - Occupational Stress Questionnaire     Feeling of Stress : Rather much   Social Connections: Unknown (2/8/2022)    Received from [facility]    Social Connection and Isolation Panel [NHANES]     Frequency of Communication with Friends and Family: Three times a week     Frequency of  Social Gatherings with Friends and Family: Three times a week     Attends Orthodoxy Services: Never     Active Member of Clubs or Organizations: No     Attends Club or Organization Meetings: Never     Marital Status: Patient declined   Housing Stability: High Risk (2022)    Received from [facility]    Housing Stability Vital Sign     Unable to Pay for Housing in the Last Year: Yes     Number of Places Lived in the Last Year: 2     Unstable Housing in the Last Year: No      Past Surgical History:   Procedure Laterality Date    Cholecystectomy      Forearm/wrist surgery unlisted Right     ulnar nerve, 2019               REVIEW OF SYSTEMS:   GENERAL HEALTH: feels well otherwise  GENERAL : denies fever, chills, sweats, weight loss, weight gain  SKIN: denies any unusual skin lesions or rashes  RESPIRATORY: denies shortness of breath with exertion  NEURO: denies headaches    EXAM:   Ht 5' 10\" (1.778 m)   Wt 225 lb (102.1 kg)   LMP 2024 (Approximate)   BMI 32.28 kg/m²     System Findings Details   Constitutional  Overall appearance - Normal.   Head/Face  Facial features -- Normal. Skull - Normal.   Eyes  Sclera white, pupils equal and round   Ears  External ears normal in appearance, EAC patent, TM intact, no evidence of middle ear effusion   Nose  External nose normal in appearance, nares patent, septum straight, no epistaxis   Throat  Posterior pharynx clear, uvula midline, tonsils 2+, no erythema or exudate   Oral cavity  Lips normal in appearance, mucous membranes clear, no masses, FOM soft, tongue without lesions   Neck  Trachea midline, no lymphadenopathy, no masses   Neurological  Memory - Normal. Cranial nerves - Cranial nerves II through XII grossly intact.       ASSESSMENT AND PLAN:   24 year old female presenting for evaluation of recurrent tonsillitis.     -Had 3 positive strep tests in our system, says she had 2-3 other positive tests at outside facility  -If she continues to  have multiple strep positive episodes over next 1-2 years (3 or more) will consider tonsillectomy. Educated her that viral pharyngitis is not an indication for tonsillectomy    The patient indicates understanding of these issues and agrees to the plan.    Mariann Valiente MD  10/29/2024  3:44 PM
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
## 2024-11-04 NOTE — TELEPHONE ENCOUNTER
Future Appointments   Date Time Provider Department Center   11/7/2024  2:40 PM [facility]C [id number]   11/9/2024 11:00 AM Dorothy Lovell MD [facility] [place]     Please advise if patient needs right shoulder xrays.
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
## 2024-11-08 NOTE — TELEPHONE ENCOUNTER
XR CLAVICLE, COMPLETE, RIGHT (CPT=73000)    Result Date: 11/2/2024  CONCLUSION:    No acute fractures seen.  There is mild superior position of the distal clavicle in relation to the acromion most likely reflecting AC joint sprain in the setting of recent injury and pain.  Slight widening also seen at the AC joint likely  related.  No tissue abnormalities.  LOCATION:  [facility]   Dictated by (CST): Roberto Carlos Wong MD on 11/02/2024 at 10:29 AM     Finalized by (CST): Roberto Carlos Wong MD on 11/02/2024 at 10:30 AM       XR SHOULDER, COMPLETE (MIN 2 VIEWS), RIGHT (CPT=73030)    Result Date: 11/2/2024  CONCLUSION:    The joint profile view shows a greater than typical space between the glenoid and the humerus especially along the upper aspect of the glenohumeral joint, which can be seen with joint effusion or traumatic subluxation.  AC joint will be described separately on dedicated views of the clavicle and AC joint.  No fracture lines are seen.  No other tissue abnormalities are seen.  LOCATION:  [facility]   Dictated by (CST): Roberto Carlos Wong MD on 11/02/2024 at 10:27 AM     Finalized by (CST): Roberto Carlos Wong MD on 11/02/2024 at 10:28 AM

## 2024-11-13 NOTE — PROGRESS NOTES
Chief Complaint   Patient presents with    Physical     Annual exam w/o pap        HPI:   Darling Metzger is a 24 year old female who presents for a complete physical with gyne exam.   Patient feels well, dental visit up to date, no hearing problem.  Vaccinations: Flu today    LMP: 10/16/2024  Sexual activity:monogamy   Contraception:stopped OCPs, desires pregnancy in 12/2024  Exercise:  active at work .  Diet:  regular    Wt Readings from Last 3 Encounters:   11/09/24 242 lb 9.6 oz (110 kg)   10/29/24 225 lb (102.1 kg)   08/29/24 230 lb (104.3 kg)      BP Readings from Last 3 Encounters:   11/09/24 116/72   10/16/24 129/81   08/29/24 124/88     Patient's last menstrual period was 10/16/2024 (approximate).     Annual physical:  Overall pt feels terrible, is currently sick with cough and nasal congestion.    LMP: 10/16/24  Sexually Active: monogamous  Last pap smear: 12/2023, normal pap (Rpt in 3 yrs)  Last mammogram: n/a  Last Colon Ca screening: n/a   Last DEXA Scan: n/a    Exercise: active at work  Diet: regular    Cough and congestion: pt has had almost 1 week of feeling cough and sore throat. She had a subjective fever. Is having some chest tightness, but no SOB and no wheezing. She works at a . No recent travel.  She states her dtr was recently sick. She has not done a Covid test yet.           Current Outpatient Medications   Medication Sig Dispense Refill    albuterol 108 (90 Base) MCG/ACT Inhalation Aero Soln Inhale 2 puffs into the lungs every 4 (four) hours as needed. 1 each 1    benzonatate 200 MG Oral Cap Take 1 capsule (200 mg total) by mouth 3 (three) times daily as needed for cough. 30 capsule 0    Meloxicam 15 MG Oral Tab Take 1 tablet (15 mg total) by mouth daily. 14 tablet 0    Acetaminophen 500 MG Oral Cap Take 2 capsules (1,000 mg total) by mouth.      ibuprofen 600 MG Oral Tab Take 1 tablet (600 mg total) by mouth.      tiZANidine 4 MG Oral Tab Take 1 tablet (4 mg total) by  mouth every 8 (eight) hours as needed.      Etonogestrel-Ethinyl Estradiol (NUVARING) 0.12-0.015 MG/24HR Vaginal Ring Place 1 Ring vaginally every 30 (thirty) days. 3 weeks in, one week out (Patient not taking: Reported on 2024) 3 each 3      Past Medical History:    Allergic rhinitis    Anxiety    Depression    Esophageal reflux    Obesity      Past Surgical History:   Procedure Laterality Date    Cholecystectomy      Forearm/wrist surgery unlisted Right     ulnar nerve, 2019            Family History   Problem Relation Age of Onset    Depression Mother     Obesity Mother     Psychiatric Mother     Depression Father     Psychiatric Father     Anemia Brother     Asthma Maternal Grandmother     Dementia Maternal Grandmother     Diabetes Maternal Grandmother     Heart Disorder Maternal Grandmother     Stroke Maternal Grandmother     Breast Cancer Neg     Colon Cancer Neg       Social History:  Social History     Socioeconomic History    Marital status:    Tobacco Use    Smoking status: Never     Passive exposure: Never    Smokeless tobacco: Never   Vaping Use    Vaping status: Former    Substances: Nicotine   Substance and Sexual Activity    Alcohol use: Yes     Comment: socially    Drug use: Never    Sexual activity: Yes     Partners: Male     Birth control/protection: Implant   Other Topics Concern    Caffeine Concern No    Exercise No    Seat Belt No    Stress Concern Yes    Weight Concern Yes     Social Drivers of Health     Financial Resource Strain: Medium Risk (2022)    Received from [facility]    Overall Financial Resource Strain (CARDIA)     Difficulty of Paying Living Expenses: Somewhat hard   Food Insecurity: No Food Insecurity (2022)    Received from [facility]    Hunger Vital Sign     Worried About Running Out of Food in the Last Year: Never true     Ran Out of Food in the Last Year: Never true   Transportation Needs: No Transportation Needs (2022)     [facility]    PRAPARE - Transportation     Lack of Transportation (Medical): No     Lack of Transportation (Non-Medical): No   Physical Activity: Unknown (2/8/2022)    Received from [facility]    Exercise Vital Sign     Days of Exercise per Week: 2 days     Minutes of Exercise per Session: Patient declined   Stress: Stress Concern Present (2/8/2022)    [facility]    [place] citizen [place] [employer] - Occupational Stress Questionnaire     Feeling of Stress : Rather much   Social Connections: Unknown (2/8/2022)    Received from [facility]    Social Connection and Isolation Panel [NHANES]     Frequency of Communication with Friends and Family: Three times a week     Frequency of Social Gatherings with Friends and Family: Three times a week     Attends Yazidism Services: Never     Active Member of Clubs or Organizations: No     Attends Club or Organization Meetings: Never     Marital Status: Patient declined   Housing Stability: High Risk (2/8/2022)    [facility]    Housing Stability Vital Sign     Unable to Pay for Housing in the Last Year: Yes     Number of Places Lived in the Last Year: 2     Unstable Housing in the Last Year: No       Allergies:  Allergies[1]     REVIEW OF SYSTEMS:     Review of Systems   Constitutional:  Positive for fatigue and fever. Negative for appetite change and chills.   HENT:  Positive for congestion, postnasal drip, sinus pressure and sinus pain. Negative for ear pain and sore throat.    Respiratory:  Positive for cough and chest tightness. Negative for shortness of breath and wheezing.    Gastrointestinal:  Negative for abdominal pain, constipation, diarrhea, nausea and vomiting.   Genitourinary:  Negative for dysuria and menstrual problem.   Musculoskeletal:  Positive for myalgias.        EXAM:   /72 (BP Location: Left arm, Patient Position: Sitting, Cuff  Size: adult)   Pulse 86   Temp 98.1 °F (36.7 °C) (Temporal)   Resp 18   Ht 5' 10\" (1.778 m)   Wt 242 lb 9.6 oz (110 kg)   LMP 10/16/2024 (Approximate)   SpO2 99%   BMI 34.81 kg/m²    GENERAL: WD/WN in no acute distress.   HEENT: PERRLA and EOMI.  OP moist no lesions.TM WNL, philip.Normal ears canals bilaterally.  Neck is supple, with no cervical LAD or thyroid abnormalities.  LUNGS: are clear to auscultation bilaterally, with no wheeze, rhonchi, or rales.   HEART: is RRR.  S1, S2, with no murmurs,clicks, gallops  BREAST: deferred  ABDOMEN: is soft,NBS, NT/ND with no HSM.  No rebound or guarding. No CVA tenderness, no hernias.   EXAM: deferred  RECTAL EXAM: deferred  NEURO: Cranial nerves II-XII normal,no focal abnormalities, and reflexes coordination and gait normal and symmetric.Sensation intact.  EXTREMETIES: are symmetric with no cyanosis, clubbing, or edema.  MS: Normal muscles tones, no joints abnormalities.  SKIN: Normal color, turgor, no lesions, rashes or wounds.  PSYCH: normal affect and mood.    ASSESSMENT AND PLAN:     24 year old female with     1. Annual physical exam  Routine labs ordered today, await results. Counseled pt on healthy lifestyle changes. Vaccines today: Flu today . Contraception: stopped OCPs (desires pregnancy next month) .     Last pap smear: 12/2023, normal pap (Rpt in 3 yrs)    - CBC With Differential With Platelet; Future  - Comp Metabolic Panel; Future  - Lipid Panel; Future  - TSH W Reflex To Free T4; Future    2. COVID-19 virus infection  - Rapid Strep: POSITIVE  - supportive care d/w pt  - if sx worsen or persist, advised to f-u    3. Sinus congestion  See above.    - COVID19 BinaxNOW Antigen    4. Acute pharyngitis, unspecified etiology  - Rapid Strep: neg    - Rapid Strep    5. Acute cough  - supportive care d/w pt  - Albuterol HFA refill sent  - Benzonatate Q8 prn cough, R/B/SE of new meds d/w pt    - albuterol 108 (90 Base) MCG/ACT Inhalation Aero Soln; Inhale 2 puffs  into the lungs every 4 (four) hours as needed.  Dispense: 1 each; Refill: 1  - benzonatate 200 MG Oral Cap; Take 1 capsule (200 mg total) by mouth 3 (three) times daily as needed for cough.  Dispense: 30 capsule; Refill: 0    6. Screening for ischemic heart disease    - Lipid Panel; Future    7. Screening for endocrine, nutritional, metabolic and immunity disorder    - CBC With Differential With Platelet; Future  - Comp Metabolic Panel; Future  - TSH W Reflex To Free T4; Future    8. Screening for chlamydial disease    - Chlamydia/Gc Amplification [E]; Future    9. Need for vaccination    - Fluzone trivalent vaccine, PF 0.5mL, 6mo+ (60842)        Pt's was recommended low fat diet and aerobic exercise 30 minutes three times weekly.   Health maintenance.   Osteoporosis prevention addressed  Recommended whole food type diet, eliminate processed food/low sugar and low sat fat diet      The patient indicates understanding of these issues and agrees to the plan.    Return in about 1 year (around 11/9/2025) for annual physical.       [1] No Known Allergies

## 2024-11-14 NOTE — H&P
Sports Medicine Clinic Note    Subjective:    Chief Complaint: Right shoulder pain.    Date of Injury: 10/22/2024    History: Patient is a 24-year-old female presenting with right shoulder pain that began after lifting a child at work several weeks ago, during which she felt a pop. Pain is described as aching and sharp, with constant intensity rated at 3/10, exacerbated by movement and worsened at night, causing difficulty falling asleep. Pain radiates into the upper arm and is localized to the clavicle, chest region, and sternoclavicular area. She denies numbness, tingling, or weakness but does report some vague neurologic symptoms down the entire right arm into the hand. She reports limited relief with prior use of Meloxicam, ibuprofen, acetaminophen, and a Medrol Dosepak. She has not attempted physical therapy or received injections previously. She has a reported history of ulnar nerve transposition at the wrist level when she was in high school but current symptoms feel different.    Objective:    Right Shoulder Examination:    Inspection: Skin intact over the right shoulder. No signs of muscle atrophy or skin changes.  Palpation: Tenderness over the sternoclavicular joint, acromioclavicular joint, and clavicle. No lateral acromial tenderness.  Range of Motion: Painful with forward elevation through the impingement arc to approximately 170 degrees. Discomfort with external rotation to 80 degrees.  Neurovascular: Sensation intact to light touch. No paresthesias or motor deficits detected. Good strength noted throughout exam.  Special Tests: Pain but no weakness with empty can test and resisted external rotation. Negative Sr and Neer impingement signs. Positive external rotation lag sign and drop arm testing.    Diagnostic Tests:    - X-ray of the right clavicle (11/02/2024): Mild superior position of distal clavicle relative to the acromion, consistent with an AC joint sprain. Slight widening at the AC joint  observed. No acute fracture or tissue abnormalities.  - X-ray of the right shoulder (11/02/2024): Joint profile indicates increased spacing between the glenoid and humerus, suggesting possible joint effusion or subluxation. No fracture lines observed.    Assessment:    Right shoulder pain with suspected AC joint sprain and potential traumatic subluxation of the glenohumeral joint. Differential includes AC joint sprain, glenohumeral effusion, and possible soft tissue or labral injury.    Plan:    Additional Workup: MRI of the right shoulder to assess for soft tissue injury, including possible labral involvement, and CT scan focusing on the sternoclavicular joint to evaluate joint alignment.  Medications: Continue Meloxicam 15 mg daily for pain control. May take acetaminophen as needed.  Procedures: Consider corticosteroid injection to the AC joint for pain relief.  Therapy: Recommend initiating physical therapy with a focus on gentle stretching exercises to prevent adhesive capsulitis and improve range of motion. Deferred for time being.  Activity Recommendations: Restrict lifting to no more than 10 pounds. Avoid repetitive shoulder movements and heavy use of the right upper extremity. Home stretching exercises encouraged.    Follow-Up: Tentatively scheduled after MRI and CT are completed to review imaging results and adjust treatment plan accordingly. Patient advised to return sooner if symptoms worsen or new symptoms develop.      Emanuel Ni DO, CAQSM   Primary Care Sports Medicine

## 2024-11-27 NOTE — TELEPHONE ENCOUNTER
Refill no protocol available: medication was not prescribed by Dorothy Lovell MD.    Tried calling patient to schedule appointment.  LMOM to return call to the office. Provided pt office phone [phone] along with office hours.    Pt requesting refill of   Requested Prescriptions     Pending Prescriptions Disp Refills    tiZANidine 4 MG Oral Tab 30 tablet 0     Sig: Take 1 tablet (4 mg total) by mouth every 8 (eight) hours as needed.     Last Time Medication was Filled:  10/22/2024    Last Office Visit with Provider: 11/9/2024    No future appointments.
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
## 2024-12-30 NOTE — TELEPHONE ENCOUNTER
#[id number]  All medical bills faxed or emailed:  [email or web address] or fax [phone]    [facility] [id number]  [place]  [id number]    CM:  Deepika Coppola  Workers Compensation  [employer]  [phone]

## (undated) NOTE — LETTER
Date: 12/16/2024    Patient Name: Darling Metzger          To Whom it may concern:    The above patient was seen at [facility] for treatment of a medical condition and is currently under my medical care.      The patient may continue to work with the same restrictions, no lifting/carrying more than 10 lbs, and no use of the right upper extremity. She will then follow up at her next appointment and be re-evaluated then.If you have any questions please contact our office at [phone].        Sincerely,      Emanuel Ni DO, MAGDA   Primary Care Sports Medicine

## (undated) NOTE — LETTER
Date: 10/30/2024    Patient Name: Darling Metzger          To Whom it may concern:    This letter has been written at the patient's request. The above patient was seen at [facility] for treatment of a medical condition.    The patient may return to work on 10/31/24 with the following limitations:  - no heavy lifting of objects/people greater than 10 pounds  - no pushing, pulling, or overhead lifting of objects greater than 10 pounds    These restrictions are effective starting 10/30/2024.  She is undergoing further evaluation with xrays and will be re-evaluated after that for any additional restrictions.     Should you have any questions, please do not hesitate to contact my office.    Sincerely,      Dorothy Lovell MD

## (undated) NOTE — LETTER
Date: 11/7/2024    Patient Name: Darling Metzger          To Whom it may concern:    The above patient was seen at [facility] for treatment of a medical condition and is currently under my medical care.      The patient may return to work with the following restrictions, no lifting/carrying more than 10 lbs, and no use of the right upper extremity. She will then follow up at her next appointment and be re-evaluated then.If you have any questions please contact our office at [phone].        Sincerely,    Karely Fernandez PA-C

## (undated) NOTE — LETTER
Date & Time: 8/29/2024, 6:28 PM  Patient: Darling Metzger  Encounter Provider(s):    Odessa Metzger APRN       To Whom It May Concern:    Darling Metzger was seen and treated in our department on 8/29/2024. She may return to work without restrictions on 9/3/24.    If you have any questions or concerns, please do not hesitate to call.      KAREEM Fuentes (electronically signed)

## (undated) NOTE — LETTER
Date & Time: 6/14/2024, 12:33 PM  Patient: Darling Metzger  Encounter Provider(s):    Rd Sainz PA-C       To Whom It May Concern:    Darling Metzger was seen and treated in our department on 6/14/2024. She can return to work.    If you have any questions or concerns, please do not hesitate to call.        _____________________________  Physician/APC Signature

## (undated) NOTE — LETTER
Date: 12/16/2024    Patient Name: Darling Metzger          To Whom it may concern:    The above patient was seen at [facility] for treatment of a medical condition and is currently under my medical care.      The patient may be off work until 12/18/24 at which point she can return to work with the same restrictions, no lifting/carrying more than 10 lbs, and no use of the right upper extremity. She will then follow up at her next appointment and be re-evaluated then.If you have any questions please contact our office at [phone].        Sincerely,      Emanuel Ni DO, MAGDA   Primary Care Sports Medicine